# Patient Record
Sex: FEMALE | Race: BLACK OR AFRICAN AMERICAN | NOT HISPANIC OR LATINO | URBAN - METROPOLITAN AREA
[De-identification: names, ages, dates, MRNs, and addresses within clinical notes are randomized per-mention and may not be internally consistent; named-entity substitution may affect disease eponyms.]

---

## 2019-01-25 ENCOUNTER — EMERGENCY (EMERGENCY)
Facility: HOSPITAL | Age: 64
LOS: 1 days | Discharge: HOME | End: 2019-01-25
Attending: EMERGENCY MEDICINE
Payer: MEDICARE

## 2019-01-25 VITALS
WEIGHT: 153 LBS | DIASTOLIC BLOOD PRESSURE: 73 MMHG | OXYGEN SATURATION: 100 % | SYSTOLIC BLOOD PRESSURE: 147 MMHG | TEMPERATURE: 98 F | HEART RATE: 105 BPM | RESPIRATION RATE: 18 BRPM | HEIGHT: 66 IN

## 2019-01-25 PROCEDURE — 99283 EMERGENCY DEPT VISIT LOW MDM: CPT

## 2019-01-25 PROCEDURE — 73502 X-RAY EXAM HIP UNI 2-3 VIEWS: CPT | Mod: 26,RT

## 2019-01-25 PROCEDURE — 99283 EMERGENCY DEPT VISIT LOW MDM: CPT | Mod: GC

## 2019-01-25 PROCEDURE — 73502 X-RAY EXAM HIP UNI 2-3 VIEWS: CPT

## 2019-01-25 RX ORDER — IBUPROFEN 200 MG
800 TABLET ORAL ONCE
Qty: 0 | Refills: 0 | Status: COMPLETED | OUTPATIENT
Start: 2019-01-25 | End: 2019-01-25

## 2019-01-25 RX ORDER — ACETAMINOPHEN 500 MG
975 TABLET ORAL ONCE
Qty: 0 | Refills: 0 | Status: COMPLETED | OUTPATIENT
Start: 2019-01-25 | End: 2019-01-25

## 2019-01-25 RX ADMIN — Medication 975 MILLIGRAM(S): at 11:02

## 2019-01-25 RX ADMIN — Medication 800 MILLIGRAM(S): at 11:03

## 2019-01-25 NOTE — ED PROVIDER NOTE - NSFOLLOWUPINSTRUCTIONS_ED_ALL_ED_FT
You have hip pain which is likely from a gluteal medius strain. XRAY were normal. Take Ibuprofen 600 mg every 8 hours for the next 3-4 days. Rest and limit walking for a few days then return to normal activity as tolerated. See your doctor about physical therapy when you return home.     Attached is some information about gluteal medius strengthening.     Return to ER for new or concerning symptoms.

## 2019-01-25 NOTE — ED PROVIDER NOTE - MEDICAL DECISION MAKING DETAILS
Attending note. Right hip pain x3 days. X-ray rule out fracture. Possible greater trochanteric bursitis. Analgesia.

## 2019-01-25 NOTE — ED ADULT NURSE NOTE - NSIMPLEMENTINTERV_GEN_ALL_ED
Implemented All Universal Safety Interventions:  [place] to call system. Call bell, personal items and telephone within reach. Instruct patient to call for assistance. Room bathroom lighting operational. Non-slip footwear when patient is off stretcher. Physically safe environment: no spills, clutter or unnecessary equipment. Stretcher in lowest position, wheels locked, appropriate side rails in place.

## 2019-01-25 NOTE — ED PROVIDER NOTE - ATTENDING CONTRIBUTION TO CARE
I performed a history and physical exam of the patient and discussed their management with the resident/ACP. I reviewed the resident/ACP's note and agree with the documented findings and plan of care.  attn see MDM

## 2019-01-25 NOTE — ED PROVIDER NOTE - PROGRESS NOTE DETAILS
[facility] PGY1- negative radiographs for effusion/ fx, history/exam c/w glut medial stain, pain greatly improved s/p ibuprofen, ambulated without pain, d/c with ibuprofen, glut med strength exercises, advised to seek PT when she returns home in 2 weeks

## 2019-01-25 NOTE — ED ADULT NURSE NOTE - OBJECTIVE STATEMENT
63 year old female presents to ED via wheel chair through waiting room with daughter from daughters home complaining of sudden onset right hip pain x 3 days after standing up from sitting position in a chair. History HLD, HTN. States that she has had this pain twice in the past and usually resolves within a couple of days. point tenderness to right hip with palpation. difficulty walking due to pain. denies radiation, fall, trauma, numbness, tingling,

## 2019-01-25 NOTE — ED PROVIDER NOTE - OBJECTIVE STATEMENT
Attending note. Patient was seen in the fast track room #2. Patient complaining of acute onset of right lateral hip pain 3 days ago while getting out of a chair. Patient is now not able to weight-bear. Patient took ibuprofen last night. She denies any other joint aches or pains and denies fever. Patient has had prior episodes which were similar in the past and was limited to 3 days. She denies any numbness or paresthesia. She has no back pain or abdominal pain. Pain radiates down the lateral side of the right thigh.

## 2019-01-25 NOTE — ED PROVIDER NOTE - PHYSICAL EXAMINATION
Attending note. Patient is alert and in no acute distress. Back and spine are nontender. Pelvis is nontender. Patient has tenderness over the right greater trochanter. There is no groin tenderness. Patient is able to actively straight leg raise. Patient has maximal pain with resisted hip abduction, but no pain with resisted adduction. Patient has decreased internal and external rotation of the right hip due to hip pain. He is nontender. There is no leg edema. Distal pulses are normal. Sensation is intact in all.

## 2022-01-24 NOTE — ED ADULT NURSE NOTE - NS ED NURSE DISCH DISPOSITION
Refill request for   hydroCORTisone (CORTIZONE) 2.5 % cream  Last refill: 10/2/21     Last visit: 10/12/21  Future appointment: 4/12/21    Refill Protocol Met     Refill submitted     Discharged